# Patient Record
Sex: FEMALE | Race: WHITE
[De-identification: names, ages, dates, MRNs, and addresses within clinical notes are randomized per-mention and may not be internally consistent; named-entity substitution may affect disease eponyms.]

---

## 2017-06-18 ENCOUNTER — HOSPITAL ENCOUNTER (INPATIENT)
Dept: HOSPITAL 62 - LC | Age: 27
LOS: 2 days | Discharge: HOME | End: 2017-06-20
Attending: OBSTETRICS & GYNECOLOGY | Admitting: OBSTETRICS & GYNECOLOGY
Payer: OTHER GOVERNMENT

## 2017-06-18 DIAGNOSIS — D62: ICD-10-CM

## 2017-06-18 DIAGNOSIS — Z3A.40: ICD-10-CM

## 2017-06-18 LAB
APPEARANCE UR: CLEAR
BARBITURATES UR QL SCN: NEGATIVE
BASOPHILS # BLD AUTO: 0.1 10^3/UL (ref 0–0.2)
BASOPHILS NFR BLD AUTO: 0.5 % (ref 0–2)
BILIRUB UR QL STRIP: NEGATIVE
EOSINOPHIL # BLD AUTO: 0.1 10^3/UL (ref 0–0.6)
EOSINOPHIL NFR BLD AUTO: 0.8 % (ref 0–6)
ERYTHROCYTE [DISTWIDTH] IN BLOOD BY AUTOMATED COUNT: 16.3 % (ref 11.5–14)
GLUCOSE UR STRIP-MCNC: NEGATIVE MG/DL
HCT VFR BLD CALC: 33.6 % (ref 36–47)
HGB BLD-MCNC: 10.6 G/DL (ref 12–15.5)
HGB HCT DIFFERENCE: -1.8
KETONES UR STRIP-MCNC: (no result) MG/DL
LYMPHOCYTES # BLD AUTO: 3.4 10^3/UL (ref 0.5–4.7)
LYMPHOCYTES NFR BLD AUTO: 29.8 % (ref 13–45)
MCH RBC QN AUTO: 25.3 PG (ref 27–33.4)
MCHC RBC AUTO-ENTMCNC: 31.6 G/DL (ref 32–36)
MCV RBC AUTO: 80 FL (ref 80–97)
METHADONE UR QL SCN: NEGATIVE
MONOCYTES # BLD AUTO: 0.8 10^3/UL (ref 0.1–1.4)
MONOCYTES NFR BLD AUTO: 7.3 % (ref 3–13)
NEUTROPHILS # BLD AUTO: 6.9 10^3/UL (ref 1.7–8.2)
NEUTS SEG NFR BLD AUTO: 61.6 % (ref 42–78)
NITRITE UR QL STRIP: NEGATIVE
PCP UR QL SCN: NEGATIVE
PH UR STRIP: 6 [PH] (ref 5–9)
PROT UR STRIP-MCNC: NEGATIVE MG/DL
RBC # BLD AUTO: 4.19 10^6/UL (ref 3.72–5.28)
SP GR UR STRIP: 1.01
URINE OPIATES LOW: NEGATIVE
UROBILINOGEN UR-MCNC: NEGATIVE MG/DL (ref ?–2)
WBC # BLD AUTO: 11.3 10^3/UL (ref 4–10.5)

## 2017-06-18 PROCEDURE — 86900 BLOOD TYPING SEROLOGIC ABO: CPT

## 2017-06-18 PROCEDURE — 86592 SYPHILIS TEST NON-TREP QUAL: CPT

## 2017-06-18 PROCEDURE — 86850 RBC ANTIBODY SCREEN: CPT

## 2017-06-18 PROCEDURE — 85027 COMPLETE CBC AUTOMATED: CPT

## 2017-06-18 PROCEDURE — 36415 COLL VENOUS BLD VENIPUNCTURE: CPT

## 2017-06-18 PROCEDURE — 80307 DRUG TEST PRSMV CHEM ANLYZR: CPT

## 2017-06-18 PROCEDURE — 86901 BLOOD TYPING SEROLOGIC RH(D): CPT

## 2017-06-18 PROCEDURE — 85025 COMPLETE CBC W/AUTO DIFF WBC: CPT

## 2017-06-18 PROCEDURE — 81005 URINALYSIS: CPT

## 2017-06-18 PROCEDURE — 4A1HXCZ MONITORING OF PRODUCTS OF CONCEPTION, CARDIAC RATE, EXTERNAL APPROACH: ICD-10-PCS | Performed by: OBSTETRICS & GYNECOLOGY

## 2017-06-18 RX ADMIN — FAMOTIDINE SCH MG: 20 TABLET, FILM COATED ORAL at 10:55

## 2017-06-18 RX ADMIN — PRENATAL W/O A VIT W/ FE FUMARATE-FA CAP 106.5-1 MG SCH CAP: 106.5 CAPSULE ORAL at 09:37

## 2017-06-18 RX ADMIN — FERROUS SULFATE TAB 325 MG (65 MG ELEMENTAL FE) SCH MG: 325 (65 FE) TAB at 17:16

## 2017-06-18 RX ADMIN — DOCUSATE SODIUM SCH MG: 100 CAPSULE, LIQUID FILLED ORAL at 09:37

## 2017-06-18 RX ADMIN — FERROUS SULFATE TAB 325 MG (65 MG ELEMENTAL FE) SCH MG: 325 (65 FE) TAB at 09:37

## 2017-06-18 RX ADMIN — IBUPROFEN SCH MG: 800 TABLET, FILM COATED ORAL at 13:06

## 2017-06-18 RX ADMIN — DOCUSATE SODIUM SCH MG: 100 CAPSULE, LIQUID FILLED ORAL at 17:16

## 2017-06-18 RX ADMIN — IBUPROFEN SCH MG: 800 TABLET, FILM COATED ORAL at 21:09

## 2017-06-18 RX ADMIN — SENNOSIDES, DOCUSATE SODIUM SCH EACH: 50; 8.6 TABLET, FILM COATED ORAL at 09:37

## 2017-06-18 RX ADMIN — FAMOTIDINE SCH MG: 20 TABLET, FILM COATED ORAL at 21:09

## 2017-06-18 NOTE — ADMISSION PHYSICAL
=================================================================



=================================================================

Datetime Report Generated by CPN: 2017 09:05

   

   

=================================================================

CURRENT ADMISSION

=================================================================

   

Chief Complaint:  Uterine Contractions

Indication for Induction:  Not Applicable

Admit Plan:  Admit to Unit; Initiate Labor Protocol

   

=================================================================

ALLERGIES

=================================================================

   

Medication Allergies:  No

Medication Allergies:  No Known Allergies (2015)

Latex:  No Latex Allergies

   

=================================================================

OBSTETRICAL HISTORY

=================================================================

   

EDC:  2017 00:00

:  3

Para:  2

Term:  2

:  0

SAB:  0

IAB:  0

Ectopic:  0

Livin

Cesareans:  0

VBACs:  0

Multiple Births:  0

Gestational Diabetes:  No

Rh Sensitization:  No

Incompetent Cervix:  No

GERALDO:  No

Infertility:  No

ART Treatment:  No

Uterine Anomaly:  No

IUGR:  No

Hx Previous C/S:  No

Macrosomia:  No

Hx Loss/Stillborn:  No

PIH:  No

Hx  Death:  No

Placenta Previa/Abruption:  No

Depression/PP Depression:  No

PTL/PROM:  No

Post Partum Hemorrhage:  No

Current Pregnancy Procedures:  Ultrasound; NST

Obstetrical History Comments:  G1:  male,  @ 40 weeks, nuchal

   G2:  male,  @ 38 weeks, precip delivery

   G3: current

   

=================================================================

***SEE PRENATAL RECORDS***

=================================================================

   

Alcohol:  No

Marijuana :  No

Cocaine:  No

Other Illicit Drugs:  No

Cigarettes:  Never Smoker. [id number]

   

=================================================================

MEDICAL HISTORY

=================================================================

   

Diabetes:  No

Blood Transfusion:  No

Pulmonary Disease (Asthma, TB):  No

Breast Disease:  No

Hypertension:  No

Gyn Surgery:  No

Heart Disease:  No

Hosp/Surgery:  Yes

Autoimmune Disorder:  No

Anesthetic Complications:  No

Kidney Disease:  No

Abnormal Pap Smear:  No

Neuro/Epilepsy:  No

Psychiatric Disorders:  No

Other Medical Diseases:  No

Hepatitis/Liver Disease:  No

Significant Family History:  No

Varicosities/Phlebitis:  No

Trauma/Violence :  No

Thyroid Dysfunction:  No

Medical History Comments:  labor and delivery

   

=================================================================

INFECTIOUS HISTORY

=================================================================

   

Gonorrhea:  No

Genital Herpes:  No

Chlamydia:  No

Tuberculosis:  No

Syphilis:  No

Hepatitis:  No

HIV/AIDS Exposure:  No

Rash or Viral Illness:  No

HPV:  No

   

=================================================================

PHYSICAL EXAM

=================================================================

   

General:  Normal

HEENT:  Normal

Neurologic:  Normal

Thyroid:  Normal

Heart:  Normal

Lungs:  Normal

Breast:  Deferred

Back:  Normal

Abdomen:  Normal

Genitourinary Exam:  Normal

Extremities:  Normal

DTRs:  Normal

Pelvic Type:  Adequate

Vital Signs:  Reviewed

   

=================================================================

VAGINAL EXAM

=================================================================

   

Dilatation:  7

Effacement:  100

Station:  2

   

=================================================================

MEMBRANES

=================================================================

   

Pooling:  Negative

Membranes:  Intact

   

=================================================================

FETUS A

=================================================================

   

EGA:  37.5

Monitoring:  External US

FHR- Baseline:  120

Variability:  Minimal - Undetectable to <=5bpm

Decelerations:  None

FHR Category:  Category II

Fetal Presentation:  Vertex

Admit Comment:  She is admitted and will likely have a baby soon.

   

=================================================================

PLANS FOR LABOR AND DELIVERY

=================================================================

   

Labor and Delivery:  None

Pain Management:  Natural

Feeding Preference:  Breast

Benefit of Breast Feed Discussed:  Yes

Circumcision:  N/A

   

=================================================================

INFORMED CONSENT

=================================================================

   

Signature:  Electronically signed by London Lezama MD (ARIC) on

   2017 at 04:40  with User ID: [email or web address]

Signature:  Electronically signed by London Lezama MD (ARIC) on

   2017 at 04:38  with User ID: [email or web address]

:  Electronically signed by London Lezama MD (ARIC) on 2017 at

   04:38  with User ID: [email or web address]

## 2017-06-18 NOTE — DELIVERY SUMMARY
=================================================================

Del Sum A-C

=================================================================

Datetime Report Generated by CPN: 2017 08:11

   

   

=================================================================

DELIVERY PERSONNEL

=================================================================

   

DELIVERY PERSONNEL:  ,[phone]

Delivery Doctor::  London Lezama MD

Labor and Delivery Nurse::  Radha Chase RN

Labor and Delivery Nurse::  Geri Mccormick RN

Scrub Tech/CNA:  Tressa Cazaresgerald, ST

   

=================================================================

MATERNAL INFORMATION

=================================================================

   

Delivery Anesthesia:  None

Medications After Delivery:  Pitocin Drip 20 Units/1000ml NSS;

   Other-Please Comment

Meds After Delivery Comment:  cytotec 1000mcg NM

Estimated  Blood Loss (ml):  250

Maternal Complications:  Precipitous Labor (<3hrs)

   

=================================================================

LABOR SUMMARY

=================================================================

   

EDC:  2017 00:00

No. Babies in Womb:  1

 Attempted:  No

Labor Anesthesia:  None

   

=================================================================

LABOR INFORMATION

=================================================================

   

Reason for Induction:  Not Applicable

Onset of Labor:  2017 02:00

Complete Dilatation:  2017 06:13

Oxytocin:  N/A

Group B Beta Strep:  Positive

Antibiotics # of Doses:  1

Antibiotics Time of Last Dose:  0456

Name of Antibiotic Given:  PCN G 5 Million Units

Steroids Given:  None

Reason Steroids Not Administered:  Not Applicable

   

=================================================================

MEMBRANES

=================================================================

   

Membranes Rupture Method:  Artificial

Rupture of Membranes:  2017 06:10

Length of Rupture (hr):  0.05

Amniotic Fluid Color:  Clear

Amniotic Fluid Amount:  Moderate

Amniotic Fluid Odor:  Normal

   

=================================================================

STAGES OF LABOR

=================================================================

   

Stage 1 hr:  4

Stage 1 min:  13

Stage 2 hr:  0

Stage 2 min:  0

Stage 3 hr:  0

Stage 3 min:  6

Total Time in Labor hr:  4

Total Time in Labor min:  19

   

=================================================================

VAGINAL DELIVERY

=================================================================

   

Episiotomy:  None

Laceration Extension:  N/A

Laceration Type:  None

Laceration Repair:  Not Applicable

Sponge Count Correct:  Yes; Vaginal Sweep Performed

Sharps Count Correct:  Yes

   

=================================================================

CSECTION DELIVERY

=================================================================

   

Primary Indication:  N/A

CSection Incision:  N/A

   

=================================================================

BABY A INFORMATION

=================================================================

   

Infant Delivery Date/Time:  2017 06:13

Method of Delivery:  Vaginal

Born in Route :  No

:  N/A

Forceps:  N/A

Vacuum Extraction:  N/A

Shoulder Dystocia :  No

   

=================================================================

PRESENTATION/POSITION BABY A

=================================================================

   

Presentation:  Cephalic

Cephalic Presentation:  Vertex

Vertex Position:  OA

Breech Presentation:  N/A

   

=================================================================

PLACENTA INFORMATION BABY A

=================================================================

   

Placenta Delivery Time :  2017 06:19

Placenta Method of Delivery:  Spontaneous

Placenta Status:  Delivered

   

=================================================================

APGAR SCORES BABY A

=================================================================

   

Heart Rate 1 min:  >100 bpm

Resp Effort 1 min:  Good Cry

Reflex Irritability 1 min:  Cough or Sneeze or Pulls Away

Muscle Tone 1 min:  Active Motion

Color 1 min:  Body Pink, Extremities Blue

Resuscitation Effort 1 min:  Tactile Stimulation

APGAR SCORE 1 MIN:  9

Heart Rate 5 min:  >100 bpm

Resp Effort 5 min:  Good Cry

Reflex Irritability 5 min:  Cough or Sneeze or Pulls Away

Muscle Tone 5 min:  Active Motion

Color 5 min:  Body Pink, Extremities Blue

Resuscitation Effort 5 min:  N/A

APGAR SCORE 5 MIN:  9

   

=================================================================

INFANT INFORMATION BABY A

=================================================================

   

Gestational Age at Delivery:  37.5

Gestational Status:  Early Term- 37- 38.6 Weeks

Infant Outcome :  Liveborn

Infant Condition :  Stable

Infant Sex:  Female

   

=================================================================

IDENTIFICATION BABY A

=================================================================

   

Infant Verification Date/Time:  2017 07:34

ID Band Number:  [id number]

Mother's Name Verified:  Yes

Infant Medical Record Number:  [id number]

RN Verifying Infant:  ASHLEY Meza, RN

Additional Verifying Personnel:  B Ring, RN

   

=================================================================

WEIGHT/LENGTH BABY A

=================================================================

   

Infant Birthweight (gm):  2790

Infant Weight (lb):  6

Infant Weight (oz):  2

Infant Length (in):  18.50

Infant Length (cm):  46.99

   

=================================================================

CORD INFORMATION BABY A

=================================================================

   

No. Cord Vessels:  3

Nuchal Cord :  N/A

Cord Blood Taken:  N/A

Infant Suction:  Mouth; Nose

   

=================================================================

ASSESSMENT BABY A

=================================================================

   

Infant Complications:  None

Physical Findings at Delivery:  Within Normal Limits

Infant Respirations:  Appears Normal

Skin to Skin:  Yes

Skin to Skin Time (min):  105

Neonatologist/ALS Called :  No

Transferred To:  Remains with Mother

   

=================================================================

BABY B INFORMATION

=================================================================

   

 :  N/A

   

=================================================================

SIGNATURES

=================================================================

   

Signature:  Electronically signed by London Lezama MD (SMIDA) on

   2017 at 06:23  with User ID: [email or web address]

## 2017-06-19 LAB
ERYTHROCYTE [DISTWIDTH] IN BLOOD BY AUTOMATED COUNT: 16.2 % (ref 11.5–14)
HCT VFR BLD CALC: 27.9 % (ref 36–47)
HGB BLD-MCNC: 8.8 G/DL (ref 12–15.5)
HGB HCT DIFFERENCE: -1.5
MCH RBC QN AUTO: 25.5 PG (ref 27–33.4)
MCHC RBC AUTO-ENTMCNC: 31.5 G/DL (ref 32–36)
MCV RBC AUTO: 81 FL (ref 80–97)
RBC # BLD AUTO: 3.45 10^6/UL (ref 3.72–5.28)
WBC # BLD AUTO: 10.4 10^3/UL (ref 4–10.5)

## 2017-06-19 RX ADMIN — PRENATAL W/O A VIT W/ FE FUMARATE-FA CAP 106.5-1 MG SCH CAP: 106.5 CAPSULE ORAL at 10:20

## 2017-06-19 RX ADMIN — DOCUSATE SODIUM SCH MG: 100 CAPSULE, LIQUID FILLED ORAL at 17:30

## 2017-06-19 RX ADMIN — FERROUS SULFATE TAB 325 MG (65 MG ELEMENTAL FE) SCH MG: 325 (65 FE) TAB at 10:19

## 2017-06-19 RX ADMIN — IBUPROFEN SCH MG: 800 TABLET, FILM COATED ORAL at 05:28

## 2017-06-19 RX ADMIN — IBUPROFEN SCH MG: 800 TABLET, FILM COATED ORAL at 21:14

## 2017-06-19 RX ADMIN — IBUPROFEN SCH MG: 800 TABLET, FILM COATED ORAL at 13:26

## 2017-06-19 RX ADMIN — FAMOTIDINE SCH MG: 20 TABLET, FILM COATED ORAL at 10:20

## 2017-06-19 RX ADMIN — FERROUS SULFATE TAB 325 MG (65 MG ELEMENTAL FE) SCH MG: 325 (65 FE) TAB at 17:30

## 2017-06-19 RX ADMIN — DOCUSATE SODIUM SCH MG: 100 CAPSULE, LIQUID FILLED ORAL at 10:19

## 2017-06-19 RX ADMIN — FAMOTIDINE SCH MG: 20 TABLET, FILM COATED ORAL at 21:14

## 2017-06-19 RX ADMIN — SENNOSIDES, DOCUSATE SODIUM SCH EACH: 50; 8.6 TABLET, FILM COATED ORAL at 10:19

## 2017-06-19 NOTE — PDOC PROGRESS REPORT
Subjective-OB


Subjective: 


Post Delivery Day:








27 year old.  Denies any needs at this time 


pt doing well


breastfeeding without issues


ambulating well


ff@u-1 


iv helplock removed


d/c in AM





Physical Exam (OB)


Vital Signs: 


 











Temp Pulse Resp BP Pulse Ox


 


 98.1 F   50 L  15   106/72   100 


 


 06/19/17 08:06  06/19/17 08:06  06/19/17 08:06  06/19/17 08:06  06/19/17 08:06








 Intake & Output











 06/18/17 06/19/17 06/20/17





 06:59 06:59 06:59


 


Intake Total  900 


 


Balance  900 


 


Weight  66.05 kg 














- PIH/Pre-Eclampsia


DTR's: 2 +


Clonus: Negative


Headache: Absent


Epigastric Pain: No


Visual Changes: No





- Lochia


Lochia Amount: Small 10-25 ml


Lochia Color: Rubra/Red





- Abdomen


Description: Soft


Hernia Present: No


Fundal Description: Firm, Midline


Fundal Height: u/u - u/2





Objective-Diagnostic


Laboratory: 


 





 06/19/17 07:50 





 











  06/19/17





  07:50


 


WBC  10.4


 


RBC  3.45 L


 


Hgb  8.8 L


 


Hct  27.9 L


 


MCV  81


 


MCH  25.5 L


 


MCHC  31.5 L


 


RDW  16.2 H


 


Plt Count  150

## 2017-06-20 VITALS — DIASTOLIC BLOOD PRESSURE: 69 MMHG | SYSTOLIC BLOOD PRESSURE: 108 MMHG

## 2017-06-20 RX ADMIN — DOCUSATE SODIUM SCH MG: 100 CAPSULE, LIQUID FILLED ORAL at 10:00

## 2017-06-20 RX ADMIN — FERROUS SULFATE TAB 325 MG (65 MG ELEMENTAL FE) SCH MG: 325 (65 FE) TAB at 10:00

## 2017-06-20 RX ADMIN — IBUPROFEN SCH MG: 800 TABLET, FILM COATED ORAL at 05:25

## 2017-06-20 RX ADMIN — PRENATAL W/O A VIT W/ FE FUMARATE-FA CAP 106.5-1 MG SCH CAP: 106.5 CAPSULE ORAL at 09:59

## 2017-06-20 RX ADMIN — FAMOTIDINE SCH MG: 20 TABLET, FILM COATED ORAL at 09:59

## 2017-06-20 RX ADMIN — SENNOSIDES, DOCUSATE SODIUM SCH EACH: 50; 8.6 TABLET, FILM COATED ORAL at 10:00

## 2017-06-20 NOTE — PDOC DISCHARGE SUMMARY
Final Diagnosis


Discharge Date: 17





- Final Diagnosis


(1) Acute blood loss anemia


Is this a current diagnosis for this admission?: Yes





(2) Delivery normal


Is this a current diagnosis for this admission?: Yes








Discharge Data





- Discharge Medication


Home Medications: 








Pnv W-O Ca No5/Fe Fumarate/FA [Prenatal-U Multiple Vitamin Capsule] 1 each PO 

DAILY 13 


Docusate Sodium [Colace 100 mg Capsule] 100 mg PO BID #60 capsule 17 


Ferrous Sulfate [Feosol 325 mg Tablet] 325 mg PO BID #60 tablet 17 


Ibuprofen [Motrin 800 mg Tablet] 800 mg PO Q8 #60 tablet 17 








Gestational Age: 37.5


Reason(s) for Admission: Onset of Labor, Group B Strep Positive


Prenatal Procedures: NST


Intrapartum Procedure(s): Spontaneous Vaginal Delivery





- [place] Data


  ** Baby 1 Female


Apgar at 1 minute: 9


Apgar at 5 minutes: 9


Weight: 2790 kg


Home with Mother: Yes


Complications: No





- Diagnosis Test


Laboratory: 


 











Temp Pulse Resp BP Pulse Ox


 


 98.5 F   61   18   108/69   100 


 


 17 08:08  17 08:08  17 08:08  17 08:08  17 08:08








 











  17





  04:21 04:50 07:50


 


RBC   4.19  3.45 L


 


Hgb   10.6 L  8.8 L


 


Hct   33.6 L  27.9 L


 


Urine Opiates Screen  NEGATIVE  














- Discharge information/Instructions


Discharge Activity: Activity As Tolerated, Pelvic Rest, No tub bath


Discharge Diet: Regular


Disposition: HOME, SELF-CARE


Follow up with: [facility]


in: 4, Weeks

## 2020-11-20 ENCOUNTER — HOSPITAL ENCOUNTER (OUTPATIENT)
Dept: HOSPITAL 62 - RAD | Age: 30
End: 2020-11-20
Attending: MIDWIFE
Payer: SELF-PAY

## 2020-11-20 DIAGNOSIS — Z34.81: Primary | ICD-10-CM

## 2020-11-20 DIAGNOSIS — Z3A.10: ICD-10-CM

## 2020-11-20 PROCEDURE — 76801 OB US < 14 WKS SINGLE FETUS: CPT

## 2020-11-20 NOTE — RADIOLOGY REPORT (SQ)
EXAM DESCRIPTION:  U/S [id number] TRNABD 1GES W/ODOP



IMAGES COMPLETED DATE/TIME:  11/20/2020 1:26 pm



REASON FOR STUDY:  (Z34.81)ENCOUNTER FOR SUPRVSN OF NORMAL PREGNANCY, FIRST TRIMESTER Z34.81  ENCOUNT
ER FOR SUPRVSN OF NORMAL PREGNANCY, FIRST TRIM



COMPARISON:  None.



TECHNIQUE:  Transabdominal static and realtime grayscale images acquired of the pelvis. Additional se
lected spectral and color Doppler images recorded. All images stored on PACs.

[facility]

CLINICAL DATES:  9 weeks 5 days.



LIMITATIONS:  None.



FINDINGS:  FETUS:  Single Living intrauterine pregnancy.

ULTRASOUND EGA: 10 weeks 0 days.

ULTRASOUND JACINTA: 6/18/2021

EFW: Not applicable less than 20 weeks.

CRL:  3.1 cm.

FHR: 153  beats per minute.

FETAL SURVEY: Too early to assess.

AMNIOTIC FLUID: Adequate amount.

PLACENTA: Not yet developed due to early gestation.

SUBCHORIONIC BLEED: Yes.

SIZE OF BLEED: 1.6 x 2.8 x 0.7 cm.

UTERUS: No masses. No anomalies.

CERVICAL LENGTH: 3.0 cm.   Closed.

RIGHT ADNEXA: Normal ovary with normal vascular flow.

No adnexal free fluid.

No adnexal masses.

LEFT ADNEXA: Normal ovary with normal vascular flow.

No adnexal free fluid.

No adnexal masses.

FREE FLUID: None.

OTHER: No other significant finding.



IMPRESSION:  LIVING INTRAUTERINE PREGNANCY.

EGA 10 WEEK 0 DAYS.

Trimester of pregnancy: First trimester - 0 to 13 weeks.



TECHNICAL DOCUMENTATION:  JOB ID:  [id number]

 2011 Network- All Rights Reserved                            rev-5/18



Reading location - IP/workstation name: [email or web address]